# Patient Record
Sex: MALE | Race: OTHER | Employment: FULL TIME | URBAN - METROPOLITAN AREA
[De-identification: names, ages, dates, MRNs, and addresses within clinical notes are randomized per-mention and may not be internally consistent; named-entity substitution may affect disease eponyms.]

---

## 2019-06-21 ENCOUNTER — HOSPITAL ENCOUNTER (EMERGENCY)
Age: 29
Discharge: HOME | End: 2019-06-21

## 2019-06-21 VITALS
SYSTOLIC BLOOD PRESSURE: 127 MMHG | HEIGHT: 66 IN | BODY MASS INDEX: 26.84 KG/M2 | WEIGHT: 167 LBS | TEMPERATURE: 98 F | DIASTOLIC BLOOD PRESSURE: 79 MMHG | OXYGEN SATURATION: 98 % | HEART RATE: 65 BPM | RESPIRATION RATE: 18 BRPM

## 2019-06-21 DIAGNOSIS — R22.0 NODULE OF TONGUE: Primary | ICD-10-CM

## 2019-06-21 LAB
GLUCOSE BLD-MCNC: 80 MG/DL
GLUCOSE BLD-MCNC: 80 MG/DL (ref 70–99)

## 2019-06-21 PROCEDURE — 82962 GLUCOSE BLOOD TEST: CPT

## 2019-06-21 PROCEDURE — 99282 EMERGENCY DEPT VISIT SF MDM: CPT

## 2019-06-21 ASSESSMENT — PAIN SCALES - GENERAL: PAINLEVEL_OUTOF10: 5

## 2019-06-21 NOTE — ED PROVIDER NOTES
EMERGENCY DEPARTMENT ENCOUNTER      PCP: No primary care provider on file. CHIEF COMPLAINT    Bumps on tongue    History, review of systems, disposition and plan discussed with patient via translation iPad today-patient Turkmen-speaking only. This patient was not evaluated by the attending physician. I have independently evaluated this patient . HPI    Ebony Camacho is a 33 y.o. male who presents with small bumps on the back of his tongue. Onset exactly unknown. Patient notes over the past several days small bumps. They do not hurt. No other symptoms. No fevers. No night sweats. No chills. No difficulty swallowing or sore throat. REVIEW OF SYSTEMS    Constitutional:  Denies fever, chills  HEENT:  See above  Cardiovascular:  Denies chest pain, palpitations. Respiratory:  Denies  cough or shortness of breath   GI:  Denies abdominal pain, vomiting, or diarrhea   Neurologic:  Denies confusion, light headedness, dizziness, or syncope   Skin:  No rash    All other review of systems are negative  See HPI and nursing notes for additional information       PAST MEDICAL AND SURGICAL HISTORY    No past medical history on file. No past surgical history on file. CURRENT MEDICATIONS        ALLERGIES    Allergies no known allergies    FAMILY AND SOCIAL HISTORY    No family history on file.   Social History     Socioeconomic History    Marital status: Not on file     Spouse name: Not on file    Number of children: Not on file    Years of education: Not on file    Highest education level: Not on file   Occupational History    Not on file   Social Needs    Financial resource strain: Not on file    Food insecurity:     Worry: Not on file     Inability: Not on file    Transportation needs:     Medical: Not on file     Non-medical: Not on file   Tobacco Use    Smoking status: Not on file   Substance and Sexual Activity    Alcohol use: Not on file    Drug use: Not on file    Sexual activity: Not on file   Lifestyle    Physical activity:     Days per week: Not on file     Minutes per session: Not on file    Stress: Not on file   Relationships    Social connections:     Talks on phone: Not on file     Gets together: Not on file     Attends Oriental orthodox service: Not on file     Active member of club or organization: Not on file     Attends meetings of clubs or organizations: Not on file     Relationship status: Not on file    Intimate partner violence:     Fear of current or ex partner: Not on file     Emotionally abused: Not on file     Physically abused: Not on file     Forced sexual activity: Not on file   Other Topics Concern    Not on file   Social History Narrative    Not on file       PHYSICAL EXAM    VITAL SIGNS: /79   Pulse 65   Resp 18   Ht 5' 5.75\" (1.67 m)   Wt 167 lb (75.8 kg)   SpO2 98%   BMI 27.16 kg/m²   Constitutional:  Well developed, well nourished, no acute distress, non-toxic appearance     HEENT:        - Normocephalic, atraumatic       - Frontal/Maxillary sinuses NONtender to percussion. Eyes:    - PERRL, EOM intact, conjunctiva normal, sclera non-icteric       Ears:    -  no auricular redness or induration    -  External auditory canals clear    - TMs clear without discharge, fluid, or bulging.    - Nasal passages with mildly erythematous turbinates. No massess. Oropharynx:    -  Oropharynx mildly erythematous without tonsillar hypertrophy or exudate.   - Uvula midline - no shift, no trismus, no facial swelling   -No submandibular or sublingual swelling or mass.   -Tongue with a few prominent papilla without erythema or discoloration. No white appearance of buccal surface or leukoplakia seen. Neck/Lymphatics:    - Supple neck without meningismus   -  No swollen lymph nodes. Respiratory:  Clear to auscultation bilateral lung fields, no wheezes/rales/rhonchi.  No retractions, no accessory muscle use  Cardiovascular:  Normal rate, normal rhythm   GI:  Soft, no abdominal tenderness, no guarding. Musculoskeletal:  No obvious deficits, no edema   Integument:  Skin pink, warn, dry, intact   no rash or scarletiniform appearance      Neurologic: Awake alert and oriented, and no slurred speech, no tremors or ataxia. LABS:  Results for orders placed or performed during the hospital encounter of 06/21/19   POCT Glucose   Result Value Ref Range    Glucose 80 mg/dL         ED COURSE & MEDICAL DECISION MAKING        Exact cause of patient's symptoms unknown. We discussed the possibility of viral pharyngitis versus other etiology. I do not see evidence of infection, abscess, oropharyngeal abnormalities. Patient is afebrile, nontoxic-appearing, well-hydrated. Patient tolerates oral fluids and foods without pain or obvious distress or difficulty swallowing. Given this, I will provide patient referrals to PCP and ENT today if nodular areas continue. Otherwise, patient agrees to return immediately to the emergency department if symptoms change in nature, worsen, any new symptoms occur. I recommend increase fluids, rest, Tylenol/Motrin when necessary fevers. I recommend warm salt water gargles several times daily. Clinical  IMPRESSION    1. Nodule of tongue              Comment: Please note this report has been produced using speech recognition software and may contain errors related to that system including errors in grammar, punctuation, and spelling, as well as words and phrases that may be inappropriate. If there are any questions or concerns please feel free to contact the dictating provider for clarification.        Jen Neville, [place]  06/21/19 1120

## 2019-07-07 PROCEDURE — 93005 ELECTROCARDIOGRAM TRACING: CPT | Performed by: EMERGENCY MEDICINE

## 2019-07-07 ASSESSMENT — PAIN SCALES - GENERAL: PAINLEVEL_OUTOF10: 10

## 2019-07-07 ASSESSMENT — PAIN DESCRIPTION - PAIN TYPE: TYPE: ACUTE PAIN

## 2019-07-07 ASSESSMENT — PAIN DESCRIPTION - LOCATION: LOCATION: CHEST

## 2019-07-08 ENCOUNTER — APPOINTMENT (OUTPATIENT)
Dept: GENERAL RADIOLOGY | Age: 29
End: 2019-07-08

## 2019-07-08 ENCOUNTER — HOSPITAL ENCOUNTER (EMERGENCY)
Age: 29
Discharge: ANOTHER HOSPITAL | End: 2019-07-08
Attending: EMERGENCY MEDICINE

## 2019-07-08 VITALS
BODY MASS INDEX: 29.28 KG/M2 | RESPIRATION RATE: 18 BRPM | OXYGEN SATURATION: 99 % | WEIGHT: 180 LBS | DIASTOLIC BLOOD PRESSURE: 73 MMHG | HEART RATE: 79 BPM | TEMPERATURE: 98 F | SYSTOLIC BLOOD PRESSURE: 120 MMHG

## 2019-07-08 DIAGNOSIS — J05.10 ACUTE EPIGLOTTITIS WITHOUT AIRWAY OBSTRUCTION: Primary | ICD-10-CM

## 2019-07-08 LAB
ALBUMIN SERPL-MCNC: 4.6 GM/DL (ref 3.4–5)
ALP BLD-CCNC: 98 IU/L (ref 40–128)
ALT SERPL-CCNC: 227 U/L (ref 10–40)
ANION GAP SERPL CALCULATED.3IONS-SCNC: 13 MMOL/L (ref 4–16)
AST SERPL-CCNC: 85 IU/L (ref 15–37)
BASOPHILS ABSOLUTE: 0.1 K/CU MM
BASOPHILS RELATIVE PERCENT: 0.9 % (ref 0–1)
BILIRUB SERPL-MCNC: 0.3 MG/DL (ref 0–1)
BUN BLDV-MCNC: 10 MG/DL (ref 6–23)
CALCIUM SERPL-MCNC: 8.8 MG/DL (ref 8.3–10.6)
CHLORIDE BLD-SCNC: 104 MMOL/L (ref 99–110)
CO2: 23 MMOL/L (ref 21–32)
CREAT SERPL-MCNC: 0.8 MG/DL (ref 0.9–1.3)
DIFFERENTIAL TYPE: ABNORMAL
EOSINOPHILS ABSOLUTE: 0.3 K/CU MM
EOSINOPHILS RELATIVE PERCENT: 4.3 % (ref 0–3)
GFR AFRICAN AMERICAN: >60 ML/MIN/1.73M2
GFR NON-AFRICAN AMERICAN: >60 ML/MIN/1.73M2
GLUCOSE BLD-MCNC: 108 MG/DL (ref 70–99)
HCT VFR BLD CALC: 46.3 % (ref 42–52)
HEMOGLOBIN: 15.3 GM/DL (ref 13.5–18)
IMMATURE NEUTROPHIL %: 1.3 % (ref 0–0.43)
LYMPHOCYTES ABSOLUTE: 2.8 K/CU MM
LYMPHOCYTES RELATIVE PERCENT: 44.2 % (ref 24–44)
MCH RBC QN AUTO: 30.9 PG (ref 27–31)
MCHC RBC AUTO-ENTMCNC: 33 % (ref 32–36)
MCV RBC AUTO: 93.5 FL (ref 78–100)
MONOCYTES ABSOLUTE: 0.7 K/CU MM
MONOCYTES RELATIVE PERCENT: 10.4 % (ref 0–4)
NUCLEATED RBC %: 0 %
PDW BLD-RTO: 12.8 % (ref 11.7–14.9)
PLATELET # BLD: 287 K/CU MM (ref 140–440)
PMV BLD AUTO: 9.8 FL (ref 7.5–11.1)
POTASSIUM SERPL-SCNC: 3.9 MMOL/L (ref 3.5–5.1)
RBC # BLD: 4.95 M/CU MM (ref 4.6–6.2)
SEGMENTED NEUTROPHILS ABSOLUTE COUNT: 2.5 K/CU MM
SEGMENTED NEUTROPHILS RELATIVE PERCENT: 38.9 % (ref 36–66)
SODIUM BLD-SCNC: 140 MMOL/L (ref 135–145)
TOTAL IMMATURE NEUTOROPHIL: 0.08 K/CU MM
TOTAL NUCLEATED RBC: 0 K/CU MM
TOTAL PROTEIN: 7.4 GM/DL (ref 6.4–8.2)
TROPONIN T: <0.01 NG/ML
WBC # BLD: 6.3 K/CU MM (ref 4–10.5)

## 2019-07-08 PROCEDURE — 70360 X-RAY EXAM OF NECK: CPT

## 2019-07-08 PROCEDURE — 6360000002 HC RX W HCPCS: Performed by: EMERGENCY MEDICINE

## 2019-07-08 PROCEDURE — 93010 ELECTROCARDIOGRAM REPORT: CPT | Performed by: INTERNAL MEDICINE

## 2019-07-08 PROCEDURE — 84484 ASSAY OF TROPONIN QUANT: CPT

## 2019-07-08 PROCEDURE — 96375 TX/PRO/DX INJ NEW DRUG ADDON: CPT

## 2019-07-08 PROCEDURE — 6370000000 HC RX 637 (ALT 250 FOR IP): Performed by: EMERGENCY MEDICINE

## 2019-07-08 PROCEDURE — 85025 COMPLETE CBC W/AUTO DIFF WBC: CPT

## 2019-07-08 PROCEDURE — 2580000003 HC RX 258: Performed by: EMERGENCY MEDICINE

## 2019-07-08 PROCEDURE — 80053 COMPREHEN METABOLIC PANEL: CPT

## 2019-07-08 PROCEDURE — 99285 EMERGENCY DEPT VISIT HI MDM: CPT

## 2019-07-08 PROCEDURE — 96365 THER/PROPH/DIAG IV INF INIT: CPT

## 2019-07-08 PROCEDURE — 71046 X-RAY EXAM CHEST 2 VIEWS: CPT

## 2019-07-08 RX ORDER — ACETAMINOPHEN 500 MG
1000 TABLET ORAL ONCE
Status: COMPLETED | OUTPATIENT
Start: 2019-07-08 | End: 2019-07-08

## 2019-07-08 RX ORDER — DEXAMETHASONE 4 MG/1
12 TABLET ORAL ONCE
Status: COMPLETED | OUTPATIENT
Start: 2019-07-08 | End: 2019-07-08

## 2019-07-08 RX ORDER — VANCOMYCIN HYDROCHLORIDE 1 G/200ML
1000 INJECTION, SOLUTION INTRAVENOUS ONCE
Status: COMPLETED | OUTPATIENT
Start: 2019-07-08 | End: 2019-07-08

## 2019-07-08 RX ADMIN — DEXAMETHASONE 12 MG: 4 TABLET ORAL at 01:10

## 2019-07-08 RX ADMIN — ACETAMINOPHEN 1000 MG: 500 TABLET ORAL at 01:10

## 2019-07-08 RX ADMIN — CEFTRIAXONE SODIUM 1 G: 1 INJECTION, POWDER, FOR SOLUTION INTRAMUSCULAR; INTRAVENOUS at 02:36

## 2019-07-08 RX ADMIN — VANCOMYCIN HYDROCHLORIDE 1000 MG: 1 INJECTION, SOLUTION INTRAVENOUS at 03:20

## 2019-07-08 ASSESSMENT — PAIN SCALES - GENERAL: PAINLEVEL_OUTOF10: 10

## 2019-07-08 NOTE — ED NOTES
03:05 Raffy Sabillon called from [facility] states this patient will go into bed 29499 and to call report to [phone] -- notified Dania Holly [address]  07/08/19 7572

## 2019-07-08 NOTE — ED NOTES
03:15 [facility] MICU arrived in the ED to transfer this patient      Meryle Boll [facility]  07/08/19 6824

## 2019-07-08 NOTE — ED PROVIDER NOTES
Emergency [address] EMERGENCY DEPARTMENT    Patient: Lucrecia Valerio  MRN: [id number]  : 1990  Date of Evaluation: 2019  ED Provider: Milan Gamble MD    Chief Complaint       Chief Complaint   Patient presents with    Pharyngitis    Cough    Dizziness    Chest Pain     SANJUANITA Valerio is a 33 y.o. male who presents to the emergency department with sore throat, cough and lightheadedness that began about 2 hours ago, also with a slight headache. The pt states this all started about the same time. It hurts to talk, able to swallow and tolerate secretions. No fevers. Mild anterior chest pain that started around the same time of his sore throat. Nothing changes this pain since it started. Never had this before. No nasal draininage or ear pain. No vision change or hearing change. The pt reports his 'dizziness' to be more with his eyes closed and better with his eyes open. ROS:     At least 10 systems reviewed and otherwise acutely negative except as in the [address] Past History   History reviewed. No pertinent past medical history. History reviewed. No pertinent surgical history.   Social History     Socioeconomic History    Marital status: Single     Spouse name: None    Number of children: None    Years of education: None    Highest education level: None   Occupational History    None   Social Needs    Financial resource strain: None    Food insecurity:     Worry: None     Inability: None    Transportation needs:     Medical: None     Non-medical: None   Tobacco Use    Smoking status: Never Smoker    Smokeless tobacco: Never Used   Substance and Sexual Activity    Alcohol use: Yes     Comment: occ    Drug use: Never    Sexual activity: None   Lifestyle    Physical activity:     Days per week: None     Minutes per session: None    Stress: None   Relationships    Social connections:     Talks on phone: None Gets together: None     Attends Druze service: None     Active member of club or organization: None     Attends meetings of clubs or organizations: None     Relationship status: None    Intimate partner violence:     Fear of current or ex partner: None     Emotionally abused: None     Physically abused: None     Forced sexual activity: None   Other Topics Concern    None   Social History Narrative    None       Medications/Allergies     There are no discharge medications for this patient. No Known Allergies     Physical Exam       ED Triage Vitals   BP Temp Temp Source Pulse Resp SpO2 Height Weight   07/07/19 2343 07/07/19 2343 07/07/19 2343 07/07/19 2343 07/07/19 2343 07/07/19 2343 -- 07/07/19 2346   (!) 145/97 98 °F (36.7 °C) Oral 79 18 98 %  180 lb (81.6 kg)     GENERAL APPEARANCE: Awake and alert. Cooperative. No acute distress. HEAD: Normocephalic. Atraumatic. EYES: Sclera anicteric. ENT: Tolerates saliva. No trismus. tms are clear bilaterally. Oropharynx reassuring, midline uvula, a single small, white plaque on the posterior ooropharynx with mild erythema thorughout  NECK: Supple. Trachea midline. CARDIO: RRR. Radial pulse 2+. LUNGS: Respirations unlabored. CTAB. ABDOMEN: Soft. Non-distended. Non-tender. EXTREMITIES: No acute deformities. SKIN: Warm and dry. NEUROLOGICAL: No gross facial drooping. Moves all 4 extremities spontaneously. PSYCHIATRIC: Normal mood.      Diagnostics   Labs:  Results for orders placed or performed during the hospital encounter of 07/08/19   CBC auto diff   Result Value Ref Range    WBC 6.3 4.0 - 10.5 K/CU MM    RBC 4.95 4.6 - 6.2 M/CU MM    Hemoglobin 15.3 13.5 - 18.0 GM/DL    Hematocrit 46.3 42 - 52 %    MCV 93.5 78 - 100 FL    MCH 30.9 27 - 31 PG    MCHC 33.0 32.0 - 36.0 %    RDW 12.8 11.7 - 14.9 %    Platelets 241 414 - 014 K/CU MM    MPV 9.8 7.5 - 11.1 FL    Differential Type AUTOMATED DIFFERENTIAL     Segs Relative 38.9 36 - 66 %    Lymphocytes

## 2019-07-08 NOTE — ED NOTES
03:01 Danny Phelps called from [facility] states the MICU has an eta of 03:15-03:25 -- Danny Phelps states no bed assignment yet and that she will call with that info asap -- notified Dania PALMER and Dr Dominci Bowers [facility] MARIAH  07/08/19 8142

## 2019-07-11 LAB
EKG ATRIAL RATE: 71 BPM
EKG DIAGNOSIS: NORMAL
EKG P AXIS: 50 DEGREES
EKG P-R INTERVAL: 146 MS
EKG Q-T INTERVAL: 394 MS
EKG QRS DURATION: 106 MS
EKG QTC CALCULATION (BAZETT): 428 MS
EKG R AXIS: 48 DEGREES
EKG T AXIS: 22 DEGREES
EKG VENTRICULAR RATE: 71 BPM

## 2022-06-09 ENCOUNTER — HOSPITAL ENCOUNTER (EMERGENCY)
Age: 32
Discharge: HOME | End: 2022-06-09
Attending: EMERGENCY MEDICINE

## 2022-06-09 ENCOUNTER — APPOINTMENT (OUTPATIENT)
Dept: GENERAL RADIOLOGY | Age: 32
End: 2022-06-09

## 2022-06-09 VITALS
HEIGHT: 64 IN | DIASTOLIC BLOOD PRESSURE: 85 MMHG | BODY MASS INDEX: 30.73 KG/M2 | TEMPERATURE: 98.2 F | RESPIRATION RATE: 18 BRPM | HEART RATE: 90 BPM | OXYGEN SATURATION: 98 % | SYSTOLIC BLOOD PRESSURE: 130 MMHG | WEIGHT: 180 LBS

## 2022-06-09 DIAGNOSIS — S62.315B OPEN DISPLACED FRACTURE OF BASE OF FOURTH METACARPAL BONE OF LEFT HAND, INITIAL ENCOUNTER: Primary | ICD-10-CM

## 2022-06-09 PROCEDURE — 96375 TX/PRO/DX INJ NEW DRUG ADDON: CPT

## 2022-06-09 PROCEDURE — 73130 X-RAY EXAM OF HAND: CPT

## 2022-06-09 PROCEDURE — 99284 EMERGENCY DEPT VISIT MOD MDM: CPT

## 2022-06-09 PROCEDURE — 29125 APPL SHORT ARM SPLINT STATIC: CPT

## 2022-06-09 PROCEDURE — 6360000002 HC RX W HCPCS

## 2022-06-09 PROCEDURE — 90715 TDAP VACCINE 7 YRS/> IM: CPT | Performed by: PHYSICIAN ASSISTANT

## 2022-06-09 PROCEDURE — 90471 IMMUNIZATION ADMIN: CPT | Performed by: PHYSICIAN ASSISTANT

## 2022-06-09 PROCEDURE — 2500000003 HC RX 250 WO HCPCS: Performed by: PHYSICIAN ASSISTANT

## 2022-06-09 PROCEDURE — 6360000002 HC RX W HCPCS: Performed by: PHYSICIAN ASSISTANT

## 2022-06-09 PROCEDURE — 96365 THER/PROPH/DIAG IV INF INIT: CPT

## 2022-06-09 RX ORDER — LIDOCAINE HYDROCHLORIDE 10 MG/ML
10 INJECTION, SOLUTION EPIDURAL; INFILTRATION; INTRACAUDAL; PERINEURAL ONCE
Status: COMPLETED | OUTPATIENT
Start: 2022-06-09 | End: 2022-06-09

## 2022-06-09 RX ORDER — FENTANYL CITRATE 50 UG/ML
25 INJECTION, SOLUTION INTRAMUSCULAR; INTRAVENOUS ONCE
Status: COMPLETED | OUTPATIENT
Start: 2022-06-09 | End: 2022-06-09

## 2022-06-09 RX ORDER — FENTANYL CITRATE 50 UG/ML
INJECTION, SOLUTION INTRAMUSCULAR; INTRAVENOUS
Status: COMPLETED
Start: 2022-06-09 | End: 2022-06-09

## 2022-06-09 RX ORDER — ACETAMINOPHEN 325 MG/1
650 TABLET ORAL EVERY 4 HOURS PRN
Qty: 120 TABLET | Refills: 3 | Status: SHIPPED | OUTPATIENT
Start: 2022-06-09

## 2022-06-09 RX ORDER — IBUPROFEN 600 MG/1
600 TABLET ORAL 4 TIMES DAILY PRN
Qty: 360 TABLET | Refills: 1 | Status: SHIPPED | OUTPATIENT
Start: 2022-06-09

## 2022-06-09 RX ORDER — SULFAMETHOXAZOLE AND TRIMETHOPRIM 800; 160 MG/1; MG/1
1 TABLET ORAL 2 TIMES DAILY
Qty: 20 TABLET | Refills: 0 | Status: SHIPPED | OUTPATIENT
Start: 2022-06-09 | End: 2022-06-19

## 2022-06-09 RX ORDER — CEFAZOLIN SODIUM 2 G/100ML
2000 INJECTION, SOLUTION INTRAVENOUS ONCE
Status: COMPLETED | OUTPATIENT
Start: 2022-06-09 | End: 2022-06-09

## 2022-06-09 RX ADMIN — FENTANYL CITRATE 25 MCG: 50 INJECTION, SOLUTION INTRAMUSCULAR; INTRAVENOUS at 10:13

## 2022-06-09 RX ADMIN — LIDOCAINE HYDROCHLORIDE 10 ML: 10 INJECTION, SOLUTION EPIDURAL; INFILTRATION; INTRACAUDAL; PERINEURAL at 10:05

## 2022-06-09 RX ADMIN — TETANUS TOXOID, REDUCED DIPHTHERIA TOXOID AND ACELLULAR PERTUSSIS VACCINE, ADSORBED 0.5 ML: 5; 2.5; 8; 8; 2.5 SUSPENSION INTRAMUSCULAR at 10:03

## 2022-06-09 RX ADMIN — CEFAZOLIN SODIUM 2000 MG: 2 INJECTION, SOLUTION INTRAVENOUS at 10:18

## 2022-06-09 ASSESSMENT — PAIN - FUNCTIONAL ASSESSMENT: PAIN_FUNCTIONAL_ASSESSMENT: 0-10

## 2022-06-09 ASSESSMENT — PAIN SCALES - GENERAL
PAINLEVEL_OUTOF10: 10
PAINLEVEL_OUTOF10: 3

## 2022-06-09 ASSESSMENT — PAIN DESCRIPTION - ORIENTATION: ORIENTATION: LEFT

## 2022-06-09 ASSESSMENT — PAIN DESCRIPTION - LOCATION: LOCATION: HAND

## 2022-06-09 NOTE — ED PROVIDER NOTES
Patient Identification  Cynda Dandy is a 28 y.o. male    Chief Complaint  Foreign Body in Skin (nail in left hand )      HPI  (History provided by Patient with )  This is a 28 y.o. male who was brought in by pov with boss for chief complaint of body to the left hand, patient was using a trim nail or nail gun when he accidentally nail with his left hand. He is right-hand dominant. Reports he has symptoms of pain around the site of the nail as well as radiation his fingertips. Denies any weakness or numbness in the fingers. Reports his last tetanus shot was 5 days ago, denies any pain in his wrist elbow. No other injuries. Denies any allergies to medications or chronic medical conditions. REVIEW OF SYSTEMS    Constitutional:  Denies fever, chills  HENT:  Denies sore throat or ear pain   Eyes: Denies vision changes, eye pain  Cardiovascular:  Denies chest pain, syncope  Respiratory:  Denies shortness of breath, cough   GI:  Denies abdominal pain, nausea, vomiting  :  Denies dysuria, discharge  Musculoskeletal:  Denies back pain, joint pain  Skin:  Denies rash, pruritis  Neurologic:  Denies headache, focal weakness, or sensory changes     See HPI and nursing notes for additional information     I have reviewed the following nursing documentation:  Allergies: No Known Allergies    Past medical history:  has no past medical history on file. Past surgical history:  has no past surgical history on file. Home medications:   Prior to Admission medications    Not on File       Social history:  reports that he has never smoked. He has never used smokeless tobacco. He reports current alcohol use. He reports that he does not use drugs. Family history:  History reviewed. No pertinent family history.     Exam  /82   Pulse 88   Temp 98.2 °F (36.8 °C) (Oral)   Resp 21   Ht 5' 4\" (1.626 m)   Wt 180 lb (81.6 kg)   SpO2 98%   BMI 30.90 kg/m²   Nursing note and vitals reviewed. Constitutional: Well developed, well nourished. No acute distress. HENT:      Head: Normocephalic and atraumatic. Ears: External ears normal.      Nose: Nose normal.     Mouth: Membrane mucosa moist and pink. No posterior oropharynx erythema or tonsillar edema  Eyes: Anicteric sclera. No discharge, PERRL  Neck: Supple. Trachea midline. Cardiovascular: RRR, no murmurs, rubs, or gallops, radial pulses 2+ bilaterally. Pulmonary/Chest: Effort normal. No respiratory distress. CTAB. No stridor. No wheezes. No rales. Abdominal: Soft. Nontender to palpation. No distension. No guarding, rebound tenderness, or evidence of ascites. : No CVA tenderness. Musculoskeletal: Moves all extremities. Left hand on the palmar side there is a circular nail had, dorsal side there is no exit wound. Patient has tenderness palpation around this area. Is able to abduct and abduct his fingers in the left hand, no sensory deficits. Cap refill is brisk. No injury to the left wrist or left elbow no tenderness with palpation or compression. Neurological: Alert and oriented to person, place, and time. Normal muscle tone. Sensory deficits. Discrimination intact of his distal fingers, able to abduct and abduct fingers does have some pain with thumb opposition is unable to make a fist due to pain but is able to nearly get there. Skin: Warm and dry. No rash. Psychiatric: Normal mood and affect. Behavior is normal.    Procedures  [place] with patient indication for foreign body removal, anesthesia obtained with articaine 1% without epinephrine using a field block, patient had good anesthesia prior to proceeding removal.  Then using direct traction with countertraction from assistance for Dr. Emily Sousa, all nail was removed, no fracture of the nail was appreciated, wiring was included with nail removal.  However. Procedure well. Wound was irrigated with 1 L of normal saline.   Dressed with a occlusive dressing. Radiographs (if obtained):  [] The following radiograph was interpreted by myself in the absence of a radiologist:   [x] Radiologist's Report Reviewed:  XR HAND LEFT (MIN 3 VIEWS)   Preliminary Result   Mildly displaced comminuted fracture of the 4th metacarpal proximal diaphysis   with associated 4 cm metallic nail/foreign body. MDM  76-year-old otherwise healthy male presents emergency department with a nail to his left hand from a compressed gas nail driving device. Occurred approximately 30 minutes prior to arrival, no other injuries. Vital signs are stable. Patient is neurovascularly intact, does have sensation down his fingertips, two-point discrimination intact, is able to do finger abduction abduction, thumb opposition the week and is able to make a fist although it is painful. Radiograph shows a nail going from medial to lateral through the fourth metacarpal with a comminuted fracture. Patient was given 2 g Ancef tetanus was updated, anesthesia provided with local field block utilizing 1% lidocaine without epinephrine. Then removed using traction countertraction and a pair pliers. Patient was placed in ulnar gutter splint, contacted Dr. Brigido Goldberg, will see patient in follow-up next week. Patient is placed on Bactrim for antibiotics. Return precautions were given. This patient was also seen and evaluated by Dr. Rasheeda Hooker      Final Impression  1. Open Left fourth metacarpal fracture    Blood pressure 139/82, pulse 88, temperature 98.2 °F (36.8 °C), temperature source Oral, resp. rate 21, height 5' 4\" (1.626 m), weight 180 lb (81.6 kg), SpO2 98 %. Disposition:  Discharge to home in stable condition. Patient was given scripts for the following medications. I counseled patient how to take these medications. New Prescriptions    No medications on file       This chart was generated using the [address] [place] [address] [address] [employer] system.  I created this record but it may contain dictation errors given the limitations of this technology.        Raj Zambrano PA-C  06/09/22 1045

## 2022-06-09 NOTE — ED NOTES
Discharge instructions given. Pt verbalized understanding. Pt ambulated to waiting room.         Gene Braden RN  06/09/22 1562

## 2022-06-09 NOTE — ED NOTES
Pt arrived to the ED with complaints of a work injury due to a nail going through his left hand.       Gene Braden RN  06/09/22 8755

## 2022-06-09 NOTE — ED PROVIDER NOTES
I independently examined and evaluated Barber Joy. I personally saw the patient and performed a substantive portion of the visit including all aspects of the medical decision making. In brief their history revealed patient was using a nail gun when he shot a nail through his left hand. Nail has been stuck. Pain is currently mild intensity but more moderate with wiggling of his fingers. No focal numbness or weakness. Patient is thankfully right-handed. Tetanus is up-to-date. Their focused exam revealed the patient is afebrile and hemodynamically stable on room air. The patient appears age appropriate, appears well-hydrated, well-nourished. Mucous membranes are moist. Speech is clear. Breathing is unlabored. There is nail penetrated through the mid palm. No active bleeding. Skin is dry. Mental status is normal. The patient moves all extremities and is without facial droop. ED course: Pt presents as above. Emergent conditions considered. Presentation prompted initial x-ray which does demonstrate a fourth metacarpal comminuted fracture with nail embedded in hand. Area was numbed with local lidocaine and nail was removed with traction/countertraction. Please see PA documentation for procedure note. Patient tolerated procedure well. Wound was irrigated and patient placed on antibiotics. Patient discussed with Ortho for follow-up and placed in an ulnar gutter splint. Questions sought and answered with the patient. They voice understanding and agree with plan. Instructed to return for any worsening or worrisome concerns. Is this patient to be included in the SEP-1 Core Measure due to severe sepsis or septic shock? No   Exclusion criteria - the patient is NOT to be included for SEP-1 Core Measure due to:   Infection is not suspected        All decisions regarding differential diagnosis, lab/radiology/EKG interpretation, risk of significant illness, specific reasons for performing tests, management/treatment, response to management/treatment, disposition, reasons for consults, results of consults, etc. were made by myself in conjunction with the Advanced Practice Provider. For all further details of the patient's emergency department visit, please see the Advanced Practice Provider's documentation.        Bess Colby MD  06/09/22 0997

## 2022-06-15 ENCOUNTER — TELEPHONE (OUTPATIENT)
Dept: ORTHOPEDIC SURGERY | Age: 32
End: 2022-06-15

## 2022-06-15 ENCOUNTER — OFFICE VISIT (OUTPATIENT)
Dept: ORTHOPEDIC SURGERY | Age: 32
End: 2022-06-15
Payer: COMMERCIAL

## 2022-06-15 VITALS — HEIGHT: 64 IN | WEIGHT: 180 LBS | BODY MASS INDEX: 30.73 KG/M2 | RESPIRATION RATE: 15 BRPM

## 2022-06-15 DIAGNOSIS — S62.345B: Primary | ICD-10-CM

## 2022-06-15 PROCEDURE — 99203 OFFICE O/P NEW LOW 30 MIN: CPT | Performed by: ORTHOPAEDIC SURGERY

## 2022-06-15 RX ORDER — NAPROXEN 250 MG/1
1 TABLET ORAL
COMMUNITY

## 2022-06-15 ASSESSMENT — ENCOUNTER SYMPTOMS
EYE REDNESS: 0
SHORTNESS OF BREATH: 0
ABDOMINAL PAIN: 0
COLOR CHANGE: 0

## 2022-06-15 NOTE — PROGRESS NOTES
Patient presents to the office today for ED FU of the left hand 4th metacarpal fx DOI 6/9/22 [facility]. Pt is Kuwaiti speaking. Pt states he was using a nail gun and the nail went through his hand. Pt states he is not in much pain today.

## 2022-06-15 NOTE — PROGRESS NOTES
Subjective:      Patient ID: John Martin is a 28 y.o. male. Patient presents to the office today for ED FU of the left hand 4th metacarpal fx DOI 6/9/22 [facility]. Pt is Japanese speaking. Pt states he was using a nail gun and the nail went through his hand. Pt states he is not in much pain today. He comes in today for his first visit with me in regards to his left hand injury. He states that since the injury and since the nail was removed in the ED he has been feeling much better. He is not having much pain with range of motion of the left hand or wrist.  He has been taking his oral antibiotics. Patient denies any prior injury to the involved extremity/ joint, denies numbness or tingling in the involved extremity and denies fever or chills. He was seen today with a  present remotely. Review of Systems   Constitutional: Negative for activity change, chills and fever. HENT: Negative for congestion and drooling. Eyes: Negative for redness. Respiratory: Negative for shortness of breath. Cardiovascular: Negative for chest pain. Gastrointestinal: Negative for abdominal pain. Endocrine: Negative for cold intolerance and heat intolerance. Musculoskeletal: Positive for arthralgias and myalgias. Negative for gait problem and joint swelling. Skin: Negative for color change, pallor, rash and wound. Neurological: Negative for weakness and numbness. Psychiatric/Behavioral: Negative for confusion. No past medical history on file. Objective:   Physical Exam  Constitutional:       Appearance: He is well-developed. HENT:      Head: Normocephalic and atraumatic. Eyes:      Pupils: Pupils are equal, round, and reactive to light. Pulmonary:      Effort: Pulmonary effort is normal.   Musculoskeletal:         General: No tenderness or deformity. Normal range of motion.       Right wrist: Normal.      Left wrist: Normal.      Right hand: No swelling, deformity, lacerations, tenderness or bony tenderness. Normal range of motion. Normal strength. Normal sensation. Normal sensation of the ulnar distribution, median distribution and radial distribution. There is no disruption of two-point discrimination. Normal capillary refill. Left hand: Swelling present. No deformity, lacerations, tenderness or bony tenderness. Normal range of motion. Decreased strength. Normal sensation. Normal sensation of the ulnar distribution, median distribution and radial distribution. There is no disruption of two-point discrimination. Normal capillary refill. Cervical back: Normal range of motion. Skin:     General: Skin is warm and dry. Capillary Refill: Capillary refill takes less than 2 seconds. Coloration: Skin is not pale. Findings: No erythema or rash. Neurological:      Mental Status: He is alert and oriented to person, place, and time. Sensory: No sensory deficit. Left hand-there is a small puncture wound on the palmar aspect of the left hand with no redness, no drainage, no signs of infection. Full range of motion of the fingers and wrist with very mild pain, no fluctuance, no signs of flexor tenosynovitis. XR HAND LEFT (MIN 3 VIEWS)    Result Date: 6/15/2022  XRAY X-ray 3 views of the left hand obtained and reviewed by me today in the office demonstrates age appropriate bone density throughout with the previously seen metallic nail has been removed from the left hand, the nail appears to have penetrated through 2 cortices of the proximal fourth metacarpal shaft with a small cortical disruption along the ulnar aspect, the overall alignment of the fourth metacarpal is intact. Impression: Stable left fourth metacarpal fracture with foreign body removal.       Assessment:      Left hand nail gun injury  Left fourth metacarpal fracture      Plan:      I discussed with him today his x-ray findings.   I explained to him that he did have a nail in his left fourth metacarpal which was successfully removed. I explained to him that the biggest concern with this injury is infection but there is no sign of infection at this point. I did discuss the signs of infection to watch for and if these develop he is to contact the office immediately. The fracture will heal with conservative treatment. Continue oral antibiotics. He can resume all activities with the left hand with no restrictions. Continue weight-bearing as tolerated. Continue range of motion exercises as instructed. Ice and elevate as needed. Tylenol or Motrin for pain. Follow up in 6 weeks for recheck with x-rays of left hand.             Libra 97, DO